# Patient Record
Sex: MALE | Race: BLACK OR AFRICAN AMERICAN | NOT HISPANIC OR LATINO | Employment: UNEMPLOYED | RURAL
[De-identification: names, ages, dates, MRNs, and addresses within clinical notes are randomized per-mention and may not be internally consistent; named-entity substitution may affect disease eponyms.]

---

## 2022-04-18 ENCOUNTER — OFFICE VISIT (OUTPATIENT)
Dept: FAMILY MEDICINE | Facility: CLINIC | Age: 32
End: 2022-04-18

## 2022-04-18 VITALS
WEIGHT: 184 LBS | DIASTOLIC BLOOD PRESSURE: 94 MMHG | RESPIRATION RATE: 18 BRPM | BODY MASS INDEX: 26.34 KG/M2 | HEART RATE: 80 BPM | TEMPERATURE: 98 F | SYSTOLIC BLOOD PRESSURE: 151 MMHG | HEIGHT: 70 IN

## 2022-04-18 DIAGNOSIS — J40 BRONCHITIS: ICD-10-CM

## 2022-04-18 DIAGNOSIS — I10 ESSENTIAL HYPERTENSION: ICD-10-CM

## 2022-04-18 DIAGNOSIS — J30.9 ALLERGIC RHINITIS, UNSPECIFIED SEASONALITY, UNSPECIFIED TRIGGER: ICD-10-CM

## 2022-04-18 DIAGNOSIS — Z72.51 HIGH RISK SEXUAL BEHAVIOR, UNSPECIFIED TYPE: Primary | ICD-10-CM

## 2022-04-18 PROBLEM — J45.909 ASTHMA: Status: ACTIVE | Noted: 2022-04-18

## 2022-04-18 LAB
HBV SURFACE AG SERPL QL IA: NORMAL
HCV AB SER QL: NORMAL
HIV 1+O+2 AB SERPL QL: NORMAL

## 2022-04-18 PROCEDURE — 86695 HERPES SIMPLEX TYPE 1 TEST: CPT | Mod: ,,, | Performed by: CLINICAL MEDICAL LABORATORY

## 2022-04-18 PROCEDURE — 87591 CHLAMYDIA/GONORRHOEAE(GC), PCR: ICD-10-PCS | Mod: ,,, | Performed by: CLINICAL MEDICAL LABORATORY

## 2022-04-18 PROCEDURE — 87389 HIV 1 / 2 ANTIBODY: ICD-10-PCS | Mod: ,,, | Performed by: CLINICAL MEDICAL LABORATORY

## 2022-04-18 PROCEDURE — 87591 N.GONORRHOEAE DNA AMP PROB: CPT | Mod: ,,, | Performed by: CLINICAL MEDICAL LABORATORY

## 2022-04-18 PROCEDURE — 87340 HEPATITIS B SURFACE AG IA: CPT | Mod: ,,, | Performed by: CLINICAL MEDICAL LABORATORY

## 2022-04-18 PROCEDURE — 87491 CHLAMYDIA/GONORRHOEAE(GC), PCR: ICD-10-PCS | Mod: ,,, | Performed by: CLINICAL MEDICAL LABORATORY

## 2022-04-18 PROCEDURE — 86803 HEPATITIS C ANTIBODY: ICD-10-PCS | Mod: ,,, | Performed by: CLINICAL MEDICAL LABORATORY

## 2022-04-18 PROCEDURE — 86803 HEPATITIS C AB TEST: CPT | Mod: ,,, | Performed by: CLINICAL MEDICAL LABORATORY

## 2022-04-18 PROCEDURE — 99213 OFFICE O/P EST LOW 20 MIN: CPT | Mod: ,,, | Performed by: NURSE PRACTITIONER

## 2022-04-18 PROCEDURE — 86780 TREPONEMA PALLIDUM (SYPHILIS) ANTIBODY: ICD-10-PCS | Mod: ,,, | Performed by: CLINICAL MEDICAL LABORATORY

## 2022-04-18 PROCEDURE — 87389 HIV-1 AG W/HIV-1&-2 AB AG IA: CPT | Mod: ,,, | Performed by: CLINICAL MEDICAL LABORATORY

## 2022-04-18 PROCEDURE — 86694 HERPES SIMPLEX 1 & 2 IGM: ICD-10-PCS | Mod: ,,, | Performed by: CLINICAL MEDICAL LABORATORY

## 2022-04-18 PROCEDURE — 86780 TREPONEMA PALLIDUM: CPT | Mod: ,,, | Performed by: CLINICAL MEDICAL LABORATORY

## 2022-04-18 PROCEDURE — 86696 HERPES SIMPLEX 1 & 2 IGG: ICD-10-PCS | Mod: ,,, | Performed by: CLINICAL MEDICAL LABORATORY

## 2022-04-18 PROCEDURE — 86696 HERPES SIMPLEX TYPE 2 TEST: CPT | Mod: ,,, | Performed by: CLINICAL MEDICAL LABORATORY

## 2022-04-18 PROCEDURE — 86694 HERPES SIMPLEX NES ANTBDY: CPT | Mod: ,,, | Performed by: CLINICAL MEDICAL LABORATORY

## 2022-04-18 PROCEDURE — 87491 CHLMYD TRACH DNA AMP PROBE: CPT | Mod: ,,, | Performed by: CLINICAL MEDICAL LABORATORY

## 2022-04-18 PROCEDURE — 87661 TRICHOMONAS VAGINALIS BY PCR: ICD-10-PCS | Mod: ,,, | Performed by: CLINICAL MEDICAL LABORATORY

## 2022-04-18 PROCEDURE — 87340 HEPATITIS B SURFACE ANTIGEN: ICD-10-PCS | Mod: ,,, | Performed by: CLINICAL MEDICAL LABORATORY

## 2022-04-18 PROCEDURE — 87529 MAYO GENERIC ORDERABLE: ICD-10-PCS | Mod: 90,,, | Performed by: CLINICAL MEDICAL LABORATORY

## 2022-04-18 PROCEDURE — 87661 TRICHOMONAS VAGINALIS AMPLIF: CPT | Mod: ,,, | Performed by: CLINICAL MEDICAL LABORATORY

## 2022-04-18 PROCEDURE — 86695 HERPES SIMPLEX 1 & 2 IGG: ICD-10-PCS | Mod: ,,, | Performed by: CLINICAL MEDICAL LABORATORY

## 2022-04-18 PROCEDURE — 87529 HSV DNA AMP PROBE: CPT | Mod: 90,,, | Performed by: CLINICAL MEDICAL LABORATORY

## 2022-04-18 PROCEDURE — 99213 PR OFFICE/OUTPT VISIT, EST, LEVL III, 20-29 MIN: ICD-10-PCS | Mod: ,,, | Performed by: NURSE PRACTITIONER

## 2022-04-18 RX ORDER — LOSARTAN POTASSIUM AND HYDROCHLOROTHIAZIDE 12.5; 5 MG/1; MG/1
1 TABLET ORAL DAILY
Qty: 90 TABLET | Refills: 3 | Status: SHIPPED | OUTPATIENT
Start: 2022-04-18 | End: 2022-12-07 | Stop reason: SDUPTHER

## 2022-04-18 RX ORDER — ALBUTEROL SULFATE 90 UG/1
2 AEROSOL, METERED RESPIRATORY (INHALATION) EVERY 6 HOURS PRN
Qty: 18 G | Refills: 11 | Status: SHIPPED | OUTPATIENT
Start: 2022-04-18 | End: 2023-04-18

## 2022-04-18 RX ORDER — MONTELUKAST SODIUM 10 MG/1
10 TABLET ORAL NIGHTLY
Qty: 30 TABLET | Refills: 11 | Status: SHIPPED | OUTPATIENT
Start: 2022-04-18 | End: 2022-05-18

## 2022-04-18 NOTE — PROGRESS NOTES
NICOLLE Pool   [address] [place] [address]     PATIENT NAME: Ariel Kamara  : 1990  DATE: 22  MRN: [id number]      Billing Provider: NICOLLE Pool  Level of Service: [place] OFFICE/OUTPT VISIT, EST, LEVL III, 20-29 MIN  Patient PCP Information     Provider PCP Type    NICOLLE Pool General          Reason for Visit / Chief Complaint: Cough       Update PCP  Update Chief Complaint         History of Present Illness / Problem Focused Workflow     Ariel Kamara presents to the clinic with Cough     JUANY is a 30y/o AA male with PMH of illicit drug use and smoker, who presents to clinic today from the local [facility] assisted with complaints of asthma exacerbation. Patient reports intermittent dyspnea upon awakening after lucid dreams. Patient states that sense his mother death 2 years ago he feels more anxious which has made him using albuterol inhaler once a day. Patient states being diagnosed with bronchitis as a child but never diagnosed with asthma.  Patient denies chest pain, tightness, palpitations, LANGE, orthopnea.     Patient presents with uncontrolled HTN that has never been managed and reports a history of cigarette smoking and illicit drug use including codeine, cocaine, oxycodone, marijuana, and heroin. Patient states that last reported illicit drug use was 4 weeks ago.  FH of MI, hypertension, emphysema, and unknown cancer.     Asthma  He complains of shortness of breath. There is no cough or wheezing. Pertinent negatives include no chest pain, ear pain, fever, headaches, postnasal drip, rhinorrhea, sneezing or sore throat. His past medical history is significant for asthma.   Abscess  Associated Symptoms: no fever, no chills      Review of Systems     Review of Systems   Constitutional: Negative for chills, diaphoresis, fatigue and fever.   HENT: Negative for nasal congestion, ear discharge, ear pain, facial swelling, postnasal drip, rhinorrhea, sinus  "pressure/congestion, sneezing and sore throat.    Eyes: Negative for pain, discharge, redness, itching and eye dryness.   Respiratory: Positive for shortness of breath. Negative for cough, chest tightness, wheezing and stridor.    Cardiovascular: Negative for chest pain and palpitations.   Integumentary:  Negative for pallor, rash, wound and mole/lesion.   Neurological: Negative for dizziness, facial asymmetry, weakness, light-headedness and headaches.   Psychiatric/Behavioral: Positive for sleep disturbance. Negative for agitation, confusion and dysphoric mood. The patient is nervous/anxious.         Medical / Social / Family History     Past Medical History:   Diagnosis Date    Asthma        Past Surgical History:   Procedure Laterality Date    HAND SURGERY         Social History    reports that he has been smoking cigarettes. He has never used smokeless tobacco. He reports current alcohol use. He reports current drug use. Drugs: Cocaine, Codeine, Marijuana, Oxycodone, and Heroin.    Family History  's family history is not on file.    No flowsheet data found.        Medications and Allergies     Medications  No outpatient medications have been marked as taking for the 4/18/22 encounter (Office Visit) with NICOLLE Pool.       Allergies  Review of patient's allergies indicates:  No Known Allergies    Physical Examination   BP (!) 151/94 (BP Location: Left arm, Patient Position: Sitting, BP Method: Medium (Automatic))   Pulse 80   Temp 97.7 °F (36.5 °C) (Temporal)   Resp 18   Ht 5' 10" (1.778 m)   Wt 83.5 kg (184 lb)   BMI 26.40 kg/m²   Physical Exam  Constitutional:       General: He is not in acute distress.     Appearance: Normal appearance. He is normal weight. He is not ill-appearing, toxic-appearing or diaphoretic.   HENT:      Head: Normocephalic and atraumatic.      Right Ear: Tympanic membrane, ear canal and external ear normal. There is no impacted cerumen.      Left Ear: Tympanic " membrane, ear canal and external ear normal. There is no impacted cerumen.      Nose: Nose normal. No congestion or rhinorrhea.      Mouth/Throat:      Mouth: Mucous membranes are moist.   Eyes:      General:         Right eye: No discharge.         Left eye: No discharge.      Extraocular Movements: Extraocular movements intact.      Conjunctiva/sclera: Conjunctivae normal.   Cardiovascular:      Rate and Rhythm: Normal rate and regular rhythm.      Pulses: Normal pulses.      Heart sounds: Normal heart sounds. No murmur heard.    No friction rub. No gallop.   Pulmonary:      Effort: Pulmonary effort is normal. No respiratory distress.      Breath sounds: Normal breath sounds. No stridor. No wheezing, rhonchi or rales.   Chest:      Chest wall: No tenderness.   Musculoskeletal:      Cervical back: Neck supple. No tenderness.   Skin:     General: Skin is warm.      Capillary Refill: Capillary refill takes less than 2 seconds.      Coloration: Skin is not jaundiced or pale.      Findings: No bruising, erythema, lesion or rash.   Neurological:      General: No focal deficit present.      Mental Status: He is alert and oriented to person, place, and time.   Psychiatric:         Mood and Affect: Mood normal.         Behavior: Behavior normal.         Thought Content: Thought content normal.         Judgment: Judgment normal.          Assessment and Plan (including Health Maintenance)      Problem List  Smart Sets  Document Outside HM   :    Plan:         Health Maintenance Due   Topic Date Due    Lipid Panel  Never done    COVID-19 Vaccine (1) Never done    Pneumococcal Vaccines (Age 0-64) (1 - PCV) Never done    TETANUS VACCINE  Never done    Influenza Vaccine (1) Never done       Problem List Items Addressed This Visit        ENT    Allergic rhinitis       Pulmonary    Asthma    Relevant Medications    albuterol (PROVENTIL HFA) 90 mcg/actuation inhaler    montelukast (SINGULAIR) 10 mg tablet        Cardiac/Vascular    Essential hypertension    Current Assessment & Plan     Blood Pressure Trends are:  BP Readings from Last 6 Encounters:   04/18/22 (!) 151/94              Relevant Medications    losartan-hydrochlorothiazide 50-12.5 mg (HYZAAR) 50-12.5 mg per tablet    Other Relevant Orders    CBC Auto Differential    Comprehensive Metabolic Panel       Other    High risk sexual behavior - Primary    Relevant Orders    Chlamydia/GC, PCR (Completed)    Hepatitis B Surface Antigen (Completed)    Hepatitis C Antibody (Completed)    Herpes Simplex Virus PCR (Blood)    HIV 1/2 Ag/Ab (4th Gen) (Completed)    HSV 1 & 2, IgG (Completed)    HSV 1 & 2, IgM (Completed)    Syphilis Ab, TP-PA    Trichomonas vaginalis by PCR (Completed)    [place] GENERIC ORDERABLE [facility] - Overview: Herpes Simplex Virus (HSV), Molecular Detection, PCR, Blood    Syphilis Antibody with reflex to RPR (Completed)          The patient has no Health Maintenance topics of status Not Due    Future Appointments   Date Time Provider Department Center   5/12/2022  1:30 PM NICOLLE Pool [facility] KAROL Esparza        Follow up in about 3 months (around 7/18/2022), or if symptoms worsen or fail to improve.        Signature:  NICOLLE Pool      [address] [place] [address]    Date of encounter: 4/18/22

## 2022-04-19 PROBLEM — J30.9 ALLERGIC RHINITIS: Status: ACTIVE | Noted: 2022-04-19

## 2022-04-19 LAB
CHLAMYDIA BY PCR: NEGATIVE
HSV IGM SER QL IA: NEGATIVE
HSV TYPE 1 AB IGG INDEX: >8.82
HSV TYPE 2 AB IGG INDEX: 0.16
HSV1 IGG SER QL: POSITIVE
HSV2 IGG SER QL: NEGATIVE
N. GONORRHOEAE (GC) BY PCR: NEGATIVE
SYPHILIS AB INTERPRETATION: NORMAL
TRICHOMONAS NAT: NEGATIVE

## 2022-04-21 LAB — MAYO GENERIC ORDERABLE RESULT: NORMAL

## 2022-05-12 ENCOUNTER — OFFICE VISIT (OUTPATIENT)
Dept: FAMILY MEDICINE | Facility: CLINIC | Age: 32
End: 2022-05-12

## 2022-05-12 DIAGNOSIS — I10 ESSENTIAL HYPERTENSION: ICD-10-CM

## 2022-05-12 DIAGNOSIS — K08.89 PAIN, DENTAL: Primary | ICD-10-CM

## 2022-05-12 PROCEDURE — 99213 PR OFFICE/OUTPT VISIT, EST, LEVL III, 20-29 MIN: ICD-10-PCS | Mod: ,,, | Performed by: NURSE PRACTITIONER

## 2022-05-12 PROCEDURE — 99213 OFFICE O/P EST LOW 20 MIN: CPT | Mod: ,,, | Performed by: NURSE PRACTITIONER

## 2022-05-12 RX ORDER — CLINDAMYCIN HYDROCHLORIDE 300 MG/1
300 CAPSULE ORAL EVERY 8 HOURS
Qty: 30 CAPSULE | Refills: 0 | Status: SHIPPED | OUTPATIENT
Start: 2022-05-12 | End: 2022-05-22

## 2022-05-12 RX ORDER — KETOROLAC TROMETHAMINE 10 MG/1
10 TABLET, FILM COATED ORAL EVERY 6 HOURS
Qty: 20 TABLET | Refills: 0 | Status: SHIPPED | OUTPATIENT
Start: 2022-05-12 | End: 2022-05-17

## 2022-05-21 VITALS
HEIGHT: 70 IN | HEART RATE: 92 BPM | RESPIRATION RATE: 18 BRPM | DIASTOLIC BLOOD PRESSURE: 89 MMHG | WEIGHT: 198 LBS | TEMPERATURE: 98 F | BODY MASS INDEX: 28.35 KG/M2 | SYSTOLIC BLOOD PRESSURE: 136 MMHG

## 2022-05-21 PROBLEM — K08.89 PAIN, DENTAL: Status: ACTIVE | Noted: 2022-05-21

## 2022-05-21 NOTE — PROGRESS NOTES
NICOLLE Pool   [address] [place] [address]     PATIENT NAME: Ariel Kamara  : 1990  DATE: 22  MRN: [id number]      Billing Provider: NICOLLE Pool  Level of Service: [place] OFFICE/OUTPT VISIT, EST, LEVL III, 20-29 MIN  Patient PCP Information     Provider PCP Type    NICOLLE Pool General          Reason for Visit / Chief Complaint: Asthma and Dental Pain       Update PCP  Update Chief Complaint         History of Present Illness / Problem Focused Workflow     Ariel Kamara presents to the clinic with Asthma and Dental Pain     HPI    Review of Systems     Review of Systems   Constitutional: Negative for chills, diaphoresis, fatigue and fever.   HENT: Positive for dental problem. Negative for nasal congestion, ear pain and postnasal drip.    Eyes: Negative for visual disturbance.   Respiratory: Negative for shortness of breath and wheezing.    Cardiovascular: Negative for chest pain and palpitations.   Gastrointestinal: Negative for abdominal distention and abdominal pain.   Endocrine: Negative for cold intolerance and heat intolerance.   Genitourinary: Negative for enuresis.   Musculoskeletal: Negative for neck stiffness.   Integumentary:  Negative for pallor and rash.   Neurological: Negative for dizziness, seizures, speech difficulty and weakness.   Psychiatric/Behavioral: Negative for agitation.        Medical / Social / Family History     Past Medical History:   Diagnosis Date    Asthma        Past Surgical History:   Procedure Laterality Date    HAND SURGERY         Social History    reports that he has been smoking cigarettes. He has never used smokeless tobacco. He reports current alcohol use. He reports current drug use. Drugs: Cocaine, Codeine, Marijuana, Oxycodone, and Heroin.    Family History  's family history includes No Known Problems in his father and mother.    PHQ9 2022   Total Score 0           Medications and Allergies  "    Medications  Outpatient Medications Marked as Taking for the 22 encounter (Office Visit) with NICOLLE Pool   Medication Sig Dispense Refill    albuterol (PROVENTIL HFA) 90 mcg/actuation inhaler Inhale 2 puffs into the lungs every 6 (six) hours as needed for Wheezing. Rescue 18 g 11    losartan-hydrochlorothiazide 50-12.5 mg (HYZAAR) 50-12.5 mg per tablet Take 1 tablet by mouth once daily. 90 tablet 3    [] montelukast (SINGULAIR) 10 mg tablet Take 1 tablet (10 mg total) by mouth every evening. 30 tablet 11       Allergies  Review of patient's allergies indicates:  No Known Allergies    Physical Examination   /89 (BP Location: Left arm, Patient Position: Sitting, BP Method: Large (Automatic))   Pulse 92   Temp 98.4 °F (36.9 °C) (Temporal)   Resp 18   Ht 5' 10" (1.778 m)   Wt 89.8 kg (198 lb)   BMI 28.41 kg/m²   Physical Exam  Vitals and nursing note reviewed.   Constitutional:       Appearance: Normal appearance.   HENT:      Head: Normocephalic and atraumatic.      Right Ear: External ear normal.      Left Ear: External ear normal.      Nose: Nose normal.      Mouth/Throat:      Mouth: Mucous membranes are moist.      Dentition: Gingival swelling, dental caries and gum lesions present.      Pharynx: Oropharynx is clear.   Eyes:      Pupils: Pupils are equal, round, and reactive to light.   Cardiovascular:      Rate and Rhythm: Normal rate and regular rhythm.      Pulses: Normal pulses.      Heart sounds: Normal heart sounds. No murmur heard.    No gallop.   Pulmonary:      Effort: Pulmonary effort is normal.      Breath sounds: Normal breath sounds. No wheezing or rales.   Abdominal:      General: Abdomen is flat. Bowel sounds are normal. There is no distension.      Palpations: Abdomen is soft.      Tenderness: There is no abdominal tenderness.   Musculoskeletal:      Cervical back: Normal range of motion and neck supple.   Skin:     General: Skin is warm and dry. "   Neurological:      General: No focal deficit present.      Mental Status: He is alert and oriented to person, place, and time.   Psychiatric:         Mood and Affect: Mood normal.         Behavior: Behavior normal.          Assessment and Plan (including Health Maintenance)      Problem List  Smart Sets  Document Outside HM   :    Plan:         Health Maintenance Due   Topic Date Due    Lipid Panel  Never done    COVID-19 Vaccine (1) Never done    Pneumococcal Vaccines (Age 0-64) (1 - PCV) Never done    TETANUS VACCINE  Never done       Problem List Items Addressed This Visit        ENT    Pain, dental - Primary    Relevant Medications    clindamycin (CLEOCIN) 300 MG capsule       Cardiac/Vascular    Essential hypertension          Health Maintenance Topics with due status: Not Due       Topic Last Completion Date    Influenza Vaccine Not Due       Future Appointments   Date Time Provider Department Center   5/26/2022  1:15 PM NICOLLE Pool [facility] KAROL Esparza        Follow up in about 3 months (around 8/12/2022), or if symptoms worsen or fail to improve.        Signature:  NICOLLE Pool      [address] [place] [address]    Date of encounter: 5/12/22

## 2022-05-26 ENCOUNTER — OFFICE VISIT (OUTPATIENT)
Dept: FAMILY MEDICINE | Facility: CLINIC | Age: 32
End: 2022-05-26

## 2022-05-26 VITALS
BODY MASS INDEX: 28.63 KG/M2 | DIASTOLIC BLOOD PRESSURE: 89 MMHG | HEART RATE: 67 BPM | TEMPERATURE: 98 F | RESPIRATION RATE: 18 BRPM | WEIGHT: 200 LBS | SYSTOLIC BLOOD PRESSURE: 144 MMHG | HEIGHT: 70 IN

## 2022-05-26 DIAGNOSIS — K08.9 POOR DENTITION: ICD-10-CM

## 2022-05-26 DIAGNOSIS — S02.5XXB OPEN FRACTURE OF TOOTH, INITIAL ENCOUNTER: ICD-10-CM

## 2022-05-26 DIAGNOSIS — K08.89 PAIN, DENTAL: Primary | ICD-10-CM

## 2022-05-26 PROCEDURE — 99213 OFFICE O/P EST LOW 20 MIN: CPT | Mod: ,,, | Performed by: NURSE PRACTITIONER

## 2022-05-26 PROCEDURE — 99213 PR OFFICE/OUTPT VISIT, EST, LEVL III, 20-29 MIN: ICD-10-PCS | Mod: ,,, | Performed by: NURSE PRACTITIONER

## 2022-05-26 NOTE — PROGRESS NOTES
NICOLLE Pool   [address] [place] [address]     PATIENT NAME: Ariel Kamara  : 1990  DATE: 22  MRN: [id number]      Billing Provider: NICOLLE Pool  Level of Service: [place] OFFICE/OUTPT VISIT, EST, LEVL III, 20-29 MIN  Patient PCP Information     Provider PCP Type    NICOLLE Pool General          Reason for Visit / Chief Complaint: Dental Pain and Follow-up (Follow up on tooth abscess)       Update PCP  Update Chief Complaint         History of Present Illness / Problem Focused Workflow     Ariel Kamara presents to the clinic with Dental Pain and Follow-up (Follow up on tooth abscess)     Here today for follow up dental abscess, abscess has resolve but has poor dentition.     Dental Pain   Pertinent negatives include no fever.   Follow-up  Pertinent negatives include no abdominal pain, chest pain, chills, congestion, diaphoresis, fatigue, fever, rash or weakness.       Review of Systems     Review of Systems   Constitutional: Negative for chills, diaphoresis, fatigue and fever.   HENT: Negative for nasal congestion, dental problem, ear pain and postnasal drip.    Eyes: Negative for visual disturbance.   Respiratory: Negative for shortness of breath and wheezing.    Cardiovascular: Negative for chest pain and palpitations.   Gastrointestinal: Negative for abdominal distention and abdominal pain.   Endocrine: Negative for cold intolerance and heat intolerance.   Genitourinary: Negative for enuresis.   Musculoskeletal: Negative for neck stiffness.   Integumentary:  Negative for pallor and rash.   Neurological: Negative for dizziness, seizures, speech difficulty and weakness.   Psychiatric/Behavioral: Negative for agitation.        Medical / Social / Family History     Past Medical History:   Diagnosis Date    Asthma        Past Surgical History:   Procedure Laterality Date    HAND SURGERY         Social History    reports that he has been smoking cigarettes.  "He has never used smokeless tobacco. He reports current alcohol use. He reports current drug use. Drugs: Cocaine, Codeine, Marijuana, Oxycodone, and Heroin.    Family History  's family history includes No Known Problems in his father and mother.    PHQ9 5/12/2022   Total Score 0           Medications and Allergies     Medications  No outpatient medications have been marked as taking for the 5/26/22 encounter (Office Visit) with NICOLLE Pool.       Allergies  Review of patient's allergies indicates:  No Known Allergies    Physical Examination   BP (!) 144/89 (BP Location: Left arm, Patient Position: Sitting, BP Method: Medium (Automatic))   Pulse 67   Temp 97.8 °F (36.6 °C) (Temporal)   Resp 18   Ht 5' 10" (1.778 m)   Wt 90.7 kg (200 lb)   BMI 28.70 kg/m²   Physical Exam  Vitals and nursing note reviewed.   Constitutional:       Appearance: Normal appearance.   HENT:      Head: Normocephalic and atraumatic.      Right Ear: External ear normal.      Left Ear: External ear normal.      Nose: Nose normal.      Mouth/Throat:      Mouth: Mucous membranes are moist.      Dentition: Abnormal dentition. Dental caries present.      Pharynx: Oropharynx is clear.      Comments: Multiple broken teeth  Eyes:      Pupils: Pupils are equal, round, and reactive to light.   Cardiovascular:      Rate and Rhythm: Normal rate and regular rhythm.      Pulses: Normal pulses.      Heart sounds: Normal heart sounds. No murmur heard.    No gallop.   Pulmonary:      Effort: Pulmonary effort is normal.      Breath sounds: Normal breath sounds. No wheezing or rales.   Abdominal:      General: Abdomen is flat. Bowel sounds are normal. There is no distension.      Palpations: Abdomen is soft.      Tenderness: There is no abdominal tenderness.   Musculoskeletal:         General: Normal range of motion.      Cervical back: Normal range of motion and neck supple.   Skin:     General: Skin is warm and dry.      Capillary Refill: " Capillary refill takes less than 2 seconds.   Neurological:      General: No focal deficit present.      Mental Status: He is alert and oriented to person, place, and time.   Psychiatric:         Mood and Affect: Mood normal.         Behavior: Behavior normal.          Assessment and Plan (including Health Maintenance)      Problem List  Smart Sets  Document Outside HM   :    Plan:         Health Maintenance Due   Topic Date Due    Lipid Panel  Never done    COVID-19 Vaccine (1) Never done    Pneumococcal Vaccines (Age 0-64) (1 - PCV) Never done    TETANUS VACCINE  Never done       Problem List Items Addressed This Visit        ENT    Pain, dental - Primary          Health Maintenance Topics with due status: Not Due       Topic Last Completion Date    Influenza Vaccine Not Due       No future appointments.     Follow up in about 3 months (around 8/26/2022), or if symptoms worsen or fail to improve.        Signature:  NICOLLE Pool      [address] [place] [address]    Date of encounter: 5/26/22

## 2022-12-07 ENCOUNTER — OFFICE VISIT (OUTPATIENT)
Dept: FAMILY MEDICINE | Facility: CLINIC | Age: 32
End: 2022-12-07

## 2022-12-07 VITALS
BODY MASS INDEX: 29.4 KG/M2 | RESPIRATION RATE: 18 BRPM | WEIGHT: 205.38 LBS | OXYGEN SATURATION: 99 % | HEIGHT: 70 IN | HEART RATE: 107 BPM | SYSTOLIC BLOOD PRESSURE: 177 MMHG | DIASTOLIC BLOOD PRESSURE: 109 MMHG

## 2022-12-07 DIAGNOSIS — L23.9 CONTACT ALLERGIC REACTION: Primary | ICD-10-CM

## 2022-12-07 DIAGNOSIS — I10 ESSENTIAL HYPERTENSION: ICD-10-CM

## 2022-12-07 PROCEDURE — 96372 THER/PROPH/DIAG INJ SC/IM: CPT | Mod: ,,, | Performed by: NURSE PRACTITIONER

## 2022-12-07 PROCEDURE — 99212 OFFICE O/P EST SF 10 MIN: CPT | Mod: 25,,, | Performed by: NURSE PRACTITIONER

## 2022-12-07 PROCEDURE — 99212 PR OFFICE/OUTPT VISIT, EST, LEVL II, 10-19 MIN: ICD-10-PCS | Mod: 25,,, | Performed by: NURSE PRACTITIONER

## 2022-12-07 PROCEDURE — 96372 PR INJECTION,THERAP/PROPH/DIAG2ST, IM OR SUBCUT: ICD-10-PCS | Mod: ,,, | Performed by: NURSE PRACTITIONER

## 2022-12-07 RX ORDER — LOSARTAN POTASSIUM AND HYDROCHLOROTHIAZIDE 12.5; 5 MG/1; MG/1
1 TABLET ORAL DAILY
Qty: 90 TABLET | Refills: 3 | Status: SHIPPED | OUTPATIENT
Start: 2022-12-07 | End: 2023-12-07

## 2022-12-07 RX ORDER — DEXAMETHASONE SODIUM PHOSPHATE 4 MG/ML
8 INJECTION, SOLUTION INTRA-ARTICULAR; INTRALESIONAL; INTRAMUSCULAR; INTRAVENOUS; SOFT TISSUE ONCE
Status: COMPLETED | OUTPATIENT
Start: 2022-12-07 | End: 2022-12-07

## 2022-12-07 RX ORDER — HYDROCORTISONE 25 MG/G
OINTMENT TOPICAL 3 TIMES DAILY
Qty: 453.6 G | Refills: 0 | Status: SHIPPED | OUTPATIENT
Start: 2022-12-07

## 2022-12-07 RX ADMIN — DEXAMETHASONE SODIUM PHOSPHATE 8 MG: 4 INJECTION, SOLUTION INTRA-ARTICULAR; INTRALESIONAL; INTRAMUSCULAR; INTRAVENOUS; SOFT TISSUE at 09:12

## 2022-12-07 NOTE — PROGRESS NOTES
Dorota Cross NP   [address] [place] [address]     PATIENT NAME: Ariel Kamara  : 1990  DATE: 22  MRN: [id number]      Billing Provider: Dorota Cross NP  Level of Service: [place] OFFICE/OUTPT VISIT, EST, LEVL II, 10-19 MIN  Patient PCP Information       Provider PCP Type    NICOLLE Pool General            Reason for Visit / Chief Complaint: Hand Pain       Update PCP  Update Chief Complaint         History of Present Illness / Problem Focused Workflow     Ariel Kamara presents to the clinic with Hand Pain     Hand Pain   The incident occurred 3 to 5 days ago. The quality of the pain is described as burning. The pain does not radiate. The symptoms are aggravated by palpation. He has tried acetaminophen for the symptoms. The treatment provided mild relief.     Review of Systems     Review of Systems   All other systems reviewed and are negative.     Medical / Social / Family History     Past Medical History:   Diagnosis Date    Asthma        Past Surgical History:   Procedure Laterality Date    HAND SURGERY         Social History    reports that he has been smoking cigarettes. He has never used smokeless tobacco. He reports current alcohol use. He reports current drug use. Drugs: Cocaine, Codeine, Marijuana, Oxycodone, and Heroin.    Family History  's family history includes No Known Problems in his father and mother.    Medications and Allergies     Medications  Outpatient Medications Marked as Taking for the 22 encounter (Office Visit) with Dorota Cross NP   Medication Sig Dispense Refill    albuterol (PROVENTIL HFA) 90 mcg/actuation inhaler Inhale 2 puffs into the lungs every 6 (six) hours as needed for Wheezing. Rescue 18 g 11    [DISCONTINUED] losartan-hydrochlorothiazide 50-12.5 mg (HYZAAR) 50-12.5 mg per tablet Take 1 tablet by mouth once daily. 90 tablet 3     Current Facility-Administered Medications for the  "12/7/22 encounter (Office Visit) with Dorota Cross NP   Medication Dose Route Frequency Provider Last Rate Last Admin    [COMPLETED] dexAMETHasone injection 8 mg  8 mg Intramuscular Once Dorota Velasquez KRISTINE Cross   8 mg at 12/07/22 0944       Allergies  Review of patient's allergies indicates:  No Known Allergies    Physical Examination   BP (!) 177/109 (BP Location: Left arm, Patient Position: Sitting, BP Method: Medium (Automatic))   Pulse 107   Resp 18   Ht 5' 10" (1.778 m)   Wt 93.2 kg (205 lb 6.4 oz)   SpO2 99%   BMI 29.47 kg/m²    Physical Exam  Vitals and nursing note reviewed.   Constitutional:       Appearance: Normal appearance.   HENT:      Head: Normocephalic.      Right Ear: Tympanic membrane normal.      Left Ear: Tympanic membrane normal.      Nose: Nose normal.      Mouth/Throat:      Mouth: Mucous membranes are moist.      Pharynx: Oropharynx is clear.   Eyes:      Conjunctiva/sclera: Conjunctivae normal.      Pupils: Pupils are equal, round, and reactive to light.   Cardiovascular:      Rate and Rhythm: Normal rate and regular rhythm.      Pulses: Normal pulses.      Heart sounds: Normal heart sounds.   Pulmonary:      Effort: Pulmonary effort is normal.      Breath sounds: Normal breath sounds.   Abdominal:      General: Bowel sounds are normal.      Palpations: Abdomen is soft.   Musculoskeletal:         General: Normal range of motion.      Cervical back: Normal range of motion and neck supple.   Skin:     General: Skin is warm.      Capillary Refill: Capillary refill takes less than 2 seconds.      Findings: Rash present. Rash is vesicular.      Comments: along the fingers, posterior and anterior hands bilaterally   Neurological:      General: No focal deficit present.      Mental Status: He is alert and oriented to person, place, and time.   Psychiatric:         Mood and Affect: Mood normal.         Thought Content: Thought content normal.        Assessment and Plan " (including Health Maintenance)      Problem List  Smart Sets  Document Outside HM   :    Plan:     Patient has been out of his BP medications for one week. Recommend to return to clinic for BP check in one week.     Health Maintenance Due   Topic Date Due    Lipid Panel  Never done       Problem List Items Addressed This Visit          Cardiac/Vascular    Essential hypertension    Relevant Medications    losartan-hydrochlorothiazide 50-12.5 mg (HYZAAR) 50-12.5 mg per tablet     Other Visit Diagnoses       Contact allergic reaction    -  Primary    Relevant Medications    hydrocortisone 2.5 % ointment    dexAMETHasone injection 8 mg (Completed) (Start on 12/7/2022 10:45 AM)            The patient has no Health Maintenance topics of status Not Due    No future appointments.         Signature:  Dorota Cross NP      [address] [place] [address]    Date of encounter: 12/7/22

## 2024-07-24 ENCOUNTER — OFFICE VISIT (OUTPATIENT)
Dept: FAMILY MEDICINE | Facility: CLINIC | Age: 34
End: 2024-07-24
Payer: OTHER GOVERNMENT

## 2024-07-24 VITALS
RESPIRATION RATE: 16 BRPM | DIASTOLIC BLOOD PRESSURE: 95 MMHG | TEMPERATURE: 98 F | BODY MASS INDEX: 30.28 KG/M2 | HEART RATE: 85 BPM | HEIGHT: 70 IN | WEIGHT: 211.5 LBS | OXYGEN SATURATION: 99 % | SYSTOLIC BLOOD PRESSURE: 150 MMHG

## 2024-07-24 DIAGNOSIS — I10 ESSENTIAL HYPERTENSION: Primary | ICD-10-CM

## 2024-07-24 DIAGNOSIS — J30.2 SEASONAL ALLERGIES: ICD-10-CM

## 2024-07-24 DIAGNOSIS — J40 BRONCHITIS: ICD-10-CM

## 2024-07-24 PROCEDURE — 99213 OFFICE O/P EST LOW 20 MIN: CPT | Mod: ,,, | Performed by: NURSE PRACTITIONER

## 2024-07-24 RX ORDER — ALBUTEROL SULFATE 90 UG/1
2 AEROSOL, METERED RESPIRATORY (INHALATION) EVERY 6 HOURS PRN
Qty: 18 G | Refills: 2 | Status: SHIPPED | OUTPATIENT
Start: 2024-07-24 | End: 2025-07-24

## 2024-07-24 RX ORDER — LOSARTAN POTASSIUM AND HYDROCHLOROTHIAZIDE 12.5; 5 MG/1; MG/1
1 TABLET ORAL DAILY
Qty: 90 TABLET | Refills: 1 | Status: SHIPPED | OUTPATIENT
Start: 2024-07-24

## 2024-07-24 RX ORDER — MONTELUKAST SODIUM 10 MG/1
10 TABLET ORAL NIGHTLY
Qty: 90 TABLET | Refills: 1 | Status: SHIPPED | OUTPATIENT
Start: 2024-07-24

## 2024-07-25 PROBLEM — J40 BRONCHITIS: Status: ACTIVE | Noted: 2024-07-25

## 2024-07-25 NOTE — PROGRESS NOTES
HAYLEE Lunsford   [facility] [address] [place] [address]  [phone]      PATIENT NAME: Ariel Kamara  : 1990  DATE: 24  MRN: [id number]      Billing Provider: HAYLEE Lunsford  Level of Service:   Patient PCP Information       Provider PCP Type    NICOLLE Pool General            Reason for Visit / Chief Complaint: Hypertension       Update PCP  Update Chief Complaint         History of Present Illness / Problem Focused Workflow     Ariel Kamara presents to the clinic with Hypertension     Pt was seen as a routine appointment at the [facility]. He is unable to come into clinic for appt due to incarceration. Greater than 20 minutes were spent with patient assessment, evaluation and physical exam. Home/routine medications were reviewed and updated based on records provided by the [facility] and pt confirmation.     Patient presents for routine follow-up evaluation.  He was not been seen by healthcare provider in greater than 1 year.  He has a prior history of hypertension chronic bronchitis seasonal allergies.  He reports he has been out of his medications for greater than 1 year.  He was wanting to restart his routine medications.    Previous records have been reviewed.  We will restart him on losartan HCTZ Singulair and his inhalers.  We will follow up in 2 weeks for blood pressure check.  Goal for blood pressures less than 130/80.  Encouraged the patient to monitor his salt content.        Review of Systems     Review of Systems   Constitutional:  Negative for fatigue and fever.   HENT:  Positive for nasal congestion. Negative for sore throat.    Eyes:  Negative for visual disturbance.   Respiratory:  Negative for chest tightness and shortness of breath.    Cardiovascular:  Negative for chest pain and leg swelling.   Gastrointestinal:  Negative for  "abdominal pain and change in bowel habit.   Endocrine: Negative for polydipsia, polyphagia and polyuria.   Genitourinary:  Negative for dysuria and hematuria.   Musculoskeletal:  Negative for back pain and leg pain.   Integumentary:  Negative for rash.   Neurological:  Negative for dizziness, syncope, weakness and light-headedness.        Medical / Social / Family History     Past Medical History:   Diagnosis Date    Asthma        Past Surgical History:   Procedure Laterality Date    HAND SURGERY         Social History  Mr. Kamara  reports that he has been smoking cigarettes. He has never used smokeless tobacco. He reports current alcohol use. He reports current drug use. Drugs: Cocaine, Codeine, Marijuana, Oxycodone, and Heroin.    Family History  Mr. Kamara's family history includes No Known Problems in his father and mother.    Medications and Allergies     Medications  No outpatient medications have been marked as taking for the 7/24/24 encounter (Office Visit) with Marion Philip ACNP.       Allergies  Review of patient's allergies indicates:  No Known Allergies    Physical Examination     Vitals:    07/24/24 1411   BP: (!) 150/95   Pulse: 85   Resp: 16   Temp: 98.3 °F (36.8 °C)     Physical Exam  Eyes:      Pupils: Pupils are equal, round, and reactive to light.   Cardiovascular:      Rate and Rhythm: Normal rate and regular rhythm.      Heart sounds: Normal heart sounds. No murmur heard.  Pulmonary:      Breath sounds: Normal breath sounds. No wheezing, rhonchi or rales.   Abdominal:      General: Bowel sounds are normal.      Palpations: Abdomen is soft.   Musculoskeletal:         General: No swelling.      Cervical back: Normal range of motion and neck supple.   Skin:     General: Skin is warm and dry.   Neurological:      Mental Status: He is alert and oriented to person, place, and time.          No results found for: "WBC", "HGB", "HCT", "MCV", "PLT"     No results found for: "NA", "K", " ""CL", "CO2", "GLU", "BUN", "CREATININE", "CALCIUM", "PROT", "ALBUMIN", "BILITOT", "ALKPHOS", "AST", "ALT", "ANIONGAP", "EGFRNORACEVR"   No results found for: "LABA1C", "HGBA1C"   No results found for: "CHOL"  No results found for: "HDL"  No results found for: "LDLCALC"  No results found for: "DLDL"  No results found for: "TRIG"  No results found for: "CHOLHDL"   No results found for: "TSH", "I4PMLZX", "V1QFXXM", "THYROIDAB", "FREET4"     Assessment and Plan (including Health Maintenance)      Problem List  Smart Sets  Document Outside HM   :    Plan:     1. Essential hypertension  Assessment & Plan:  BP Readings from Last 3 Encounters:   07/24/24 (!) 150/95   12/07/22 (!) 177/109   05/26/22 (!) 144/89    No meds for greater than 1yr  Restart home meds  Goal less 130/80  Follow up 2weeks    Orders:  -     losartan-hydrochlorothiazide 50-12.5 mg (HYZAAR) 50-12.5 mg per tablet; Take 1 tablet by mouth once daily.  Dispense: 90 tablet; Refill: 1    2. Bronchitis  -     albuterol (PROVENTIL HFA) 90 mcg/actuation inhaler; Inhale 2 puffs into the lungs every 6 (six) hours as needed for Wheezing. Rescue  Dispense: 18 g; Refill: 2    Other orders  -     montelukast (SINGULAIR) 10 mg tablet; Take 1 tablet (10 mg total) by mouth every evening.  Dispense: 90 tablet; Refill: 1         There are no Patient Instructions on file for this visit.     Health Maintenance Due   Topic Date Due    Lipid Panel  Never done    Pneumococcal Vaccines (Age 0-64) (1 of 2 - PCV) Never done    TETANUS VACCINE  Never done    COVID-19 Vaccine (1 - 2023-24 season) Never done    Hemoglobin A1c (Diabetic Prevention Screening)  Never done         Health Maintenance Topics with due status: Not Due       Topic Last Completion Date    Influenza Vaccine Not Due       No future appointments.         Signature:  Marion Philip, HAYLEE  [facility]  [address] [address]" [place] [address]  [phone]    Date of encounter: 7/24/24

## 2024-07-25 NOTE — ASSESSMENT & PLAN NOTE
BP Readings from Last 3 Encounters:   07/24/24 (!) 150/95   12/07/22 (!) 177/109   05/26/22 (!) 144/89    No meds for greater than 1yr  Restart home meds  Goal less 130/80  Follow up 2weeks

## 2024-10-04 DIAGNOSIS — I10 ESSENTIAL HYPERTENSION: ICD-10-CM

## 2024-10-04 RX ORDER — LOSARTAN POTASSIUM AND HYDROCHLOROTHIAZIDE 12.5; 5 MG/1; MG/1
1 TABLET ORAL DAILY
Qty: 90 TABLET | Refills: 1 | Status: SHIPPED | OUTPATIENT
Start: 2024-10-04

## 2024-10-16 RX ORDER — MONTELUKAST SODIUM 10 MG/1
10 TABLET ORAL NIGHTLY
Qty: 90 TABLET | Refills: 1 | Status: SHIPPED | OUTPATIENT
Start: 2024-10-16